# Patient Record
Sex: FEMALE | Race: WHITE
[De-identification: names, ages, dates, MRNs, and addresses within clinical notes are randomized per-mention and may not be internally consistent; named-entity substitution may affect disease eponyms.]

---

## 2018-01-30 ENCOUNTER — HOSPITAL ENCOUNTER (OUTPATIENT)
Dept: HOSPITAL 80 - FIMAGING | Age: 63
End: 2018-01-30
Attending: FAMILY MEDICINE
Payer: MEDICAID

## 2018-01-30 DIAGNOSIS — Z12.31: Primary | ICD-10-CM

## 2018-03-27 ENCOUNTER — HOSPITAL ENCOUNTER (EMERGENCY)
Dept: HOSPITAL 80 - FED | Age: 63
Discharge: ANOTHER HOSPITAL | End: 2018-03-27
Payer: MEDICAID

## 2018-03-27 VITALS — SYSTOLIC BLOOD PRESSURE: 142 MMHG | DIASTOLIC BLOOD PRESSURE: 85 MMHG

## 2018-03-27 DIAGNOSIS — T25.222A: Primary | ICD-10-CM
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
## 2018-03-27 DIAGNOSIS — T31.0: ICD-10-CM

## 2018-03-27 DIAGNOSIS — Z87.891: ICD-10-CM
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
## 2018-03-27 LAB
INR PPP: 0.98 (ref 0.83–1.16)
PLATELET # BLD: 321 10^3/UL (ref 150–400)
PROTHROMBIN TIME: 13.2 SEC (ref 12–15)
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
## 2018-04-27 ENCOUNTER — HOSPITAL ENCOUNTER (EMERGENCY)
Dept: HOSPITAL 80 - FED | Age: 63
Discharge: HOME | End: 2018-04-27
Payer: MEDICAID

## 2018-04-27 VITALS — DIASTOLIC BLOOD PRESSURE: 81 MMHG | SYSTOLIC BLOOD PRESSURE: 128 MMHG

## 2018-04-27 DIAGNOSIS — Z87.891: ICD-10-CM

## 2018-04-27 DIAGNOSIS — T25.122A: Primary | ICD-10-CM

## 2018-04-27 DIAGNOSIS — T31.0: ICD-10-CM
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
## 2018-04-27 PROCEDURE — 2W2MX4Z DRESSING OF LEFT LOWER EXTREMITY USING BANDAGE: ICD-10-PCS | Performed by: EMERGENCY MEDICINE
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
## 2018-04-28 ENCOUNTER — HOSPITAL ENCOUNTER (EMERGENCY)
Dept: HOSPITAL 80 - FED | Age: 63
Discharge: HOME | End: 2018-04-28
Payer: MEDICAID

## 2018-04-28 VITALS — DIASTOLIC BLOOD PRESSURE: 81 MMHG | SYSTOLIC BLOOD PRESSURE: 137 MMHG

## 2018-04-28 DIAGNOSIS — T81.4XXA: Primary | ICD-10-CM
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
## 2018-04-28 DIAGNOSIS — B95.62: ICD-10-CM
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
## 2018-05-11 ENCOUNTER — HOSPITAL ENCOUNTER (EMERGENCY)
Dept: HOSPITAL 80 - FED | Age: 63
LOS: 1 days | Discharge: HOME | End: 2018-05-12
Payer: MEDICAID

## 2018-05-11 VITALS — SYSTOLIC BLOOD PRESSURE: 119 MMHG | DIASTOLIC BLOOD PRESSURE: 71 MMHG

## 2018-05-11 DIAGNOSIS — E86.9: ICD-10-CM

## 2018-05-11 DIAGNOSIS — M79.652: Primary | ICD-10-CM

## 2018-05-11 LAB — PLATELET # BLD: 212 10^3/UL (ref 150–400)
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
## 2019-04-12 ENCOUNTER — HOSPITAL ENCOUNTER (OUTPATIENT)
Dept: HOSPITAL 80 - FIMAGING | Age: 64
End: 2019-04-12
Attending: INTERNAL MEDICINE
Payer: COMMERCIAL

## 2019-04-12 DIAGNOSIS — K40.90: Primary | ICD-10-CM
